# Patient Record
(demographics unavailable — no encounter records)

---

## 2024-11-13 NOTE — CONSULT LETTER
[Dear  ___] : Dear  [unfilled], [Consult Letter:] : I had the pleasure of evaluating your patient, [unfilled]. [Please see my note below.] : Please see my note below. [Consult Closing:] : Thank you very much for allowing me to participate in the care of this patient.  If you have any questions, please do not hesitate to contact me. [Sincerely,] : Sincerely, [FreeTextEntry2] : GUSTAVO CHONG (PCP) [FreeTextEntry3] : Pablo Allen MD , [facility] General Hepatology and Gastroenterology [facility] [place] [address] office: [phone] fax: [phone]

## 2024-11-13 NOTE — HISTORY OF PRESENT ILLNESS
[FreeTextEntry1] : Ms. BLUNT is a 30-year-old woman with alcohol use disorder, mild alcoholic hepatitis 5/2024, who is here after recent hospitalization to establish follow-up. She was admitted to [facility] from 10/26- because of alcohol withdrawal with vomiting that was eventually bloody. She was found to have acute blood loss anemia with Hb ~9 g/dL, diminutive esophageal varices and moderate portal HTN gastropathy, subsequently aspiration pneumonia with LLL infiltrate, severe alcoholic hepatitis without ascites and severe protein calorie malnutrition. Prednisolone was started on 11/05/24 and she was discharged to rehab of her choice in [place] on day #2 of treatment as her LFTs were improving. Day #7 would have been 11/12/24.  Labs 11/05/24: WBC 12.6, Hb 8.98, MCV 99, , INR 1.67                         Na 138, K 3.3, BUN <4, creatinine 0.45, albumin 3.0                         Bilirubin/ALP, AST/ALT 5.6/158, 171/35  Discharge medications: prednisolone 40 mg/d, carvedilol 3.125 bid, thiamine (not taking), folate, valacyclovir (for labial herpes, finished 5d).  She plans completion of inpatient rehab for 28 days, then continuation of AA group.     Alcohol history: heavy alcohol use for 10 years, inti. 5-6 shots daily, since 2021 2.5-3 [employer] Vodka bottles daily, mild alcoholic hepatitis 5/2024 with hospitalization, then sober x 1 week, joined , did not see a hepatologist, drank heavily again a month later. SHx: works as a nurse, lives alone Weight history: 144 lbs, BMI 24.7 in 11/2024, lost 10 lbs this year, from BMI 25. Max weight was 160 lbs.  Remedies/OTC meds: prn allergy meds.   ROS: Constitutional: no fever, fatigue, weight gain/loss. Eyes: no eye pain or discharge. ENT: no nosebleed, earache, change in hearing. CVS: denies chest pain, palpitations, claudication, irregular heartbeat. Resp: denies SOB, wheezing, cough, SOB on exertion. GI: denies abdominal pain, vomiting, diarrhea, heartburn, melena. Genitourinary: denies dysuria, incontinence. Musculoskeletal: denies joint pain, joint stiffness. Integumentary: denies pruritus, skin lesions, rash. Neuro: denies confusion, dizziness, fainting. Psych: denies anxiety, depression, change in personality. Endo: denies muscle weakness. Heme/lymph: denies easy bleeding and bruising.   Test results: - 11/02/24 US abdomen: hepatomegaly, steatosis, gallbladder sludge.  - 10/31/24 EGD: diminutive esoph. varices, moderate portal HTN gastropathy.  - colonoscopy: -   Physical exam: Gen: A&Ox3, NAD, overweight, jaundice HEENT: normal outer ears & nose, PERRL, mucus membranes moist, anicteric, no lymphadenopathy CVS: regular rate and rhythm, no murmur Pulm: vesicular breath sounds Abdomen: normal bowel sounds, soft, nontender, hepatomegaly 8 cm below ribs Legs: no edema, no clubbing Musculoskeletal: normal gait Skin: no rash. Spider angioma upper chest  Neuro: no asterixis, EOMI Psych: normal affect

## 2024-11-13 NOTE — ASSESSMENT
[FreeTextEntry1] : Ms. BLUNT is a 30-year-old woman with alcohol use disorder, mild alcoholic hepatitis 5/2024, and hospitalization at [facility] from 10/26-11/05/24 with alcohol withdrawal, hematemesis, electrolyte derangements, aspiration pneumonia after EGD, and severe alcoholic hepatitis without ascites.   # alcoholic hepatitis without ascites 10/2024 - prednisolone 11/05/24-, discharged on day #2 as LFTs improved, still jaundiced - varices: diminutive on EGD 10/31/24, also moderate portal HTN gastropathy - imaging: US 11/2024: non-cirrhotic hepatic morphology, steatosis, no focal mass  # overweight body habitus, BMI 24 normal likely due to muscle loss - severe protein calorie malnutrition  # AUD: heavy alcohol use since 2014 as above. In-patient alcohol rehab for 1 month currently.   I discussed that the 90-day of alcoholic hepatitis is between 25 and 85%, that progression of her liver disease can cause hepatic encephalopathy, ascites, variceal bleeding, and death, and that she cannot drink any more alcohol in her life as it would destroy her liver completely and kill her.  Plan: - bloodwork today. She was advised to call tomorrow to discuss medications. Will consider acamprosate or naltrexone.  - return in 6 weeks repeat bloodwork

## 2025-01-17 NOTE — ASSESSMENT
[FreeTextEntry1] : Ms. BLUNT is a 30-year-old woman with alcohol use disorder, mild alcoholic hepatitis 5/2024, and hospitalization at [facility] from 10/26-11/05/24 with alcohol withdrawal, hematemesis, electrolyte derangements, aspiration pneumonia after EGD, and severe alcoholic hepatitis without ascites.   # alcoholic hepatitis without ascites 10/2024, treated with prednisolone 11/05/24- for 28 days - LFTs and INR normalized by 12/18 apart from AST elevation 72 U/mL - varices: diminutive on EGD 10/31/24, also moderate portal HTN gastropathy - imaging: US 11/2024: non-cirrhotic hepatic morphology, steatosis, no focal mass  # overweight body habitus, BMI 24 normal likely due to muscle loss - hd severe protein calorie malnutrition, improved  # AUD: heavy alcohol use since 2014 as above. In-patient alcohol rehab for 1 month until 12/27/24. Goes to AA meetings daily since June 2024. SHx: resumed work as a nurse.  Plan: - bloodwork today and before the next visit in 2 months - fibroscan - no acamprosate or naltrexone for now per her preference

## 2025-01-17 NOTE — HISTORY OF PRESENT ILLNESS
[FreeTextEntry1] : - 1/17/25: returns after bloodwork at last visit and 5d later. Lille score at last visit (day #8 instead of 7) suggested lower mortality of 25% in 3 months (rather than 75-80-%). A voicemail was left. Repeat BW after that was not done. Feels good. Completed inpatient rehab in [place], goes to AA meetings every morning. Denies alcohol craving. Exam: 140 lbs, BMI 24.  Labs 12/18: Abnormal: Hb 11.3, , glc 127, AST 72. Normal: WBC, Na, creatinine 0.50, albumin 4.0, rest of LFTs with ALT 44, INR 1.14.   - 11/13/24: Ms. BLUNT is a 30-year-old woman with alcohol use disorder, mild alcoholic hepatitis 5/2024, who is here after recent hospitalization to establish follow-up. She was admitted to [facility] from 10/26- because of alcohol withdrawal with vomiting that was eventually bloody. She was found to have acute blood loss anemia with Hb ~9 g/dL, diminutive esophageal varices and moderate portal HTN gastropathy, subsequently aspiration pneumonia with LLL infiltrate, severe alcoholic hepatitis without ascites and severe protein calorie malnutrition. Prednisolone was started on 11/05/24 and she was discharged to rehab of her choice in [place] on day #2 of treatment as her LFTs were improving. Day #7 would have been 11/12/24. Labs 11/05/24: WBC 12.6, Hb 8.98, MCV 99, , INR 1.67                         Na 138, K 3.3, BUN <4, creatinine 0.45, albumin 3.0                         Bilirubin/ALP, AST/ALT 5.6/158, 171/35 Discharge medications: prednisolone 40 mg/d, carvedilol 3.125 bid, thiamine (not taking), folate, valacyclovir (for labial herpes, finished 5d).  She plans completion of inpatient rehab for 28 days, then continuation of AA group.     Weight history: 144 lbs, BMI 24.7 in 11/2024, lost 10 lbs this year, from BMI 25. Max weight was 160 lbs.  Alcohol history: heavy alcohol use for 10 years, inti. 5-6 shots daily, since 2021 2.5-3 [employer] Vodka bottles daily, mild alcoholic hepatitis 5/2024 with hospitalization, then sober x 1 week, joined AA, did not see a hepatologist, drank heavily again a month later. 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 Remedies/OTC meds: prn allergy meds.   Test results: - 11/13/24: metabolic: low transferring sat. 9%. Normal: ferritin 44 ng/mL, A1AT MM, ceruloplasmin 33 mg/dL                   autoimmune: abnormal: IgG 1869. Normal: NAVARRO, ASMA, AMA                   viral: HAV IgG+, HBsAg-/sAb+/cAb-. HCV Ab- - 11/02/24 US abdomen: hepatomegaly, steatosis, gallbladder sludge.  - 10/31/24 EGD: diminutive esoph. varices, moderate portal HTN gastropathy.  - colonoscopy: -

## 2025-01-17 NOTE — CONSULT LETTER
[Dear  ___] : Dear  [unfilled], [Consult Letter:] : I had the pleasure of evaluating your patient, [unfilled]. [Please see my note below.] : Please see my note below. [Consult Closing:] : Thank you very much for allowing me to participate in the care of this patient.  If you have any questions, please do not hesitate to contact me. [Sincerely,] : Sincerely, [FreeTextEntry2] : GUSTAVO CHONG (PCP) [FreeTextEntry3] : Pablo Allen MD , [facility] [place], [facility] General Hepatology and Gastroenterology [facility] [place] [address] office: [phone] fax: [phone]

## 2025-03-25 NOTE — ASSESSMENT
[FreeTextEntry1] : Ms. BLUNT is a 31-year-old woman with alcohol use disorder, mild alcoholic hepatitis 5/2024, and hospitalization at [facility] from 10/26-11/05/24 with alcohol withdrawal, hematemesis, electrolyte derangements, aspiration pneumonia after EGD, and severe alcoholic hepatitis without ascites.   # alcoholic hepatitis without ascites 10/2024, treated with prednisolone 11/05/24- for 28 days.  # alcohol-related cirrhosis/bridging fibrosis, MELD low.  - LFTs: mild ALP elevation 132 U/L in 3/2025 - varices: diminutive on EGD 10/31/24, also moderate portal HTN gastropathy - imaging: US 11/2024: non-cirrhotic hepatic morphology, steatosis, no focal mass - Fibroscan 2/2025: bridging fibrosis/cirrhosis 17 kPa,  G/L  # overweight body habitus, BMI 25 - hd severe protein calorie malnutrition, resolved  # AUD: heavy alcohol use since 2014 as above. In-patient alcohol rehab for 1 month until 12/27/24. Goes to AA meetings daily since June 2024; resumed work as a nurse.  Plan: - continue absolute alcohol abstinence; continue relapse prevention program - no acamprosate or naltrexone per her preference - return in August after bloodwork, and HCC screening at decreased frequency for now

## 2025-03-25 NOTE — CONSULT LETTER
[Dear  ___] : Dear  [unfilled], [Consult Letter:] : I had the pleasure of evaluating your patient, [unfilled]. [Please see my note below.] : Please see my note below. [Consult Closing:] : Thank you very much for allowing me to participate in the care of this patient.  If you have any questions, please do not hesitate to contact me. [Sincerely,] : Sincerely, [FreeTextEntry2] : GUSTAVO CHONG (PCP) [FreeTextEntry3] : Pablo Allen MD , [facility] General Hepatology and Gastroenterology [facility] [place] [address] office: [phone] fax: [phone]

## 2025-03-25 NOTE — HISTORY OF PRESENT ILLNESS
[FreeTextEntry1] : - 3/25/25: returns after bloodwork and Fibroscan. Feels good, last alcohol 145 days ago, before her hospitalization. Is working two jobs. Has not been taking acamprosate, but carvedilol. Goes to AA every day. Exam: 146 lbs, BMI 25.1 Labs 3/19: Abnormal: , Normal: , BMP, ALT 18.   - 1/17/25: returns after bloodwork at last visit and 5d later. Lille score at last visit (day #8 instead of 7) suggested lower mortality of 25% in 3 months (rather than 75-80-%). A voicemail was left. Repeat BW after that was not done. Feels good. Completed inpatient rehab in [place], goes to AA meetings every morning. Denies alcohol craving. Exam: 140 lbs, BMI 24.  Labs 12/18: Abnormal: Hb 11.3, , glc 127, AST 72. Normal: WBC, Na, creatinine 0.50, albumin 4.0, rest of LFTs with ALT 44, INR 1.14.   - 11/13/24: Ms. BLUNT is a 30-year-old woman with alcohol use disorder, mild alcoholic hepatitis 5/2024, who is here after recent hospitalization to establish follow-up. She was admitted to [facility] from 10/26- because of alcohol withdrawal with vomiting that was eventually bloody. She was found to have acute blood loss anemia with Hb ~9 g/dL, diminutive esophageal varices and moderate portal HTN gastropathy, subsequently aspiration pneumonia with LLL infiltrate, severe alcoholic hepatitis without ascites and severe protein calorie malnutrition. Prednisolone was started on 11/05/24 and she was discharged to rehab of her choice in [place] on day #2 of treatment as her LFTs were improving. Day #7 would have been 11/12/24. Labs 11/05/24: WBC 12.6, Hb 8.98, MCV 99, , INR 1.67                         Na 138, K 3.3, BUN <4, creatinine 0.45, albumin 3.0                         Bilirubin/ALP, AST/ALT 5.6/158, 171/35 Discharge medications: prednisolone 40 mg/d, carvedilol 3.125 bid, thiamine (not taking), folate, valacyclovir (for labial herpes, finished 5d).  She plans completion of inpatient rehab for 28 days, then continuation of AA group.     Weight history: 144 lbs, BMI 24.7 in 11/2024, lost 10 lbs this year, from BMI 25. Max weight was 160 lbs.  Alcohol history: heavy alcohol use for 10 years, inti. 5-6 shots daily, since 2021 2.5-3 [employer] Vodka bottles daily, mild alcoholic hepatitis 5/2024 with hospitalization, then sober x 1 week, joined , did not see a hepatologist, drank heavily again a month later. 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 Remedies/OTC meds: prn allergy meds.   Test results: - 2/3/25 Fibroscan: 17.3 kPa, 213 dB/m - F4, S0. F3 or F4 depending on publication, no steatosis. - 11/13/24: metabolic: low transferring sat. 9%. Normal: ferritin 44 ng/mL, A1AT MM, ceruloplasmin 33 mg/dL                   autoimmune: abnormal: IgG 1869. Normal: NAVARRO, ASMA, AMA                   viral: HAV IgG+, HBsAg-/sAb+/cAb-. HCV Ab- - 11/02/24 US abdomen: hepatomegaly, steatosis, gallbladder sludge.  - 10/31/24 EGD: diminutive esoph. varices, moderate portal HTN gastropathy.  - colonoscopy: -